# Patient Record
Sex: MALE | Race: BLACK OR AFRICAN AMERICAN
[De-identification: names, ages, dates, MRNs, and addresses within clinical notes are randomized per-mention and may not be internally consistent; named-entity substitution may affect disease eponyms.]

---

## 2017-04-21 ENCOUNTER — HOSPITAL ENCOUNTER (OUTPATIENT)
Dept: HOSPITAL 62 - RAD | Age: 47
End: 2017-04-21
Attending: PHYSICIAN ASSISTANT
Payer: MEDICAID

## 2017-04-21 DIAGNOSIS — K21.9: ICD-10-CM

## 2017-04-21 DIAGNOSIS — R06.2: Primary | ICD-10-CM

## 2017-04-21 DIAGNOSIS — K44.9: ICD-10-CM

## 2017-04-21 PROCEDURE — 74210 X-RAY XM PHRNX&/CRV ESOPH C+: CPT

## 2018-06-11 ENCOUNTER — HOSPITAL ENCOUNTER (OUTPATIENT)
Dept: HOSPITAL 62 - OD | Age: 48
End: 2018-06-11
Attending: PSYCHIATRY & NEUROLOGY
Payer: MEDICAID

## 2018-06-11 DIAGNOSIS — G70.01: Primary | ICD-10-CM

## 2018-06-11 LAB
FREE T4 (FREE THYROXINE): 0.98 NG/DL (ref 0.78–2.19)
T3FREE SERPL-MCNC: 3.84 PG/ML (ref 2.77–5.27)
TSH SERPL-ACNC: 3.53 UIU/ML (ref 0.47–4.68)

## 2018-06-11 PROCEDURE — 84443 ASSAY THYROID STIM HORMONE: CPT

## 2018-06-11 PROCEDURE — 84481 FREE ASSAY (FT-3): CPT

## 2018-06-11 PROCEDURE — 36415 COLL VENOUS BLD VENIPUNCTURE: CPT

## 2018-06-11 PROCEDURE — 84439 ASSAY OF FREE THYROXINE: CPT

## 2018-06-15 ENCOUNTER — HOSPITAL ENCOUNTER (OUTPATIENT)
Dept: HOSPITAL 62 - OD | Age: 48
End: 2018-06-15
Attending: PSYCHIATRY & NEUROLOGY
Payer: MEDICAID

## 2018-06-15 DIAGNOSIS — G70.01: Primary | ICD-10-CM

## 2018-06-15 PROCEDURE — 36415 COLL VENOUS BLD VENIPUNCTURE: CPT

## 2018-06-15 PROCEDURE — 83519 RIA NONANTIBODY: CPT

## 2019-03-06 ENCOUNTER — HOSPITAL ENCOUNTER (OUTPATIENT)
Dept: HOSPITAL 62 - OD | Age: 49
End: 2019-03-06
Attending: PHYSICIAN ASSISTANT
Payer: MEDICAID

## 2019-03-06 DIAGNOSIS — R06.2: Primary | ICD-10-CM

## 2019-03-06 PROCEDURE — 71046 X-RAY EXAM CHEST 2 VIEWS: CPT

## 2019-03-06 NOTE — RADIOLOGY REPORT (SQ)
EXAM DESCRIPTION:  CHEST PA/LATERAL



COMPLETED DATE/TIME:  3/6/2019 3:14 pm



REASON FOR STUDY:  WHEEZING



COMPARISON:  11/21/2012



EXAM PARAMETERS:  NUMBER OF VIEWS: two views

TECHNIQUE: Digital Frontal and Lateral radiographic views of the chest acquired.

RADIATION DOSE: NA

LIMITATIONS: none



FINDINGS:  LUNGS AND PLEURA: No opacities, masses or pneumothorax. No pleural effusion.

MEDIASTINUM AND HILAR STRUCTURES: No masses or contour abnormalities.

HEART AND VASCULAR STRUCTURES: Heart normal size.  No evidence for failure.

BONES: No acute findings.

HARDWARE:  Prior anterior median sternotomy.

OTHER:  Hiatal hernia suggested.



IMPRESSION:  1.  NO SIGNIFICANT RADIOGRAPHIC FINDING IN THE CHEST.

2.  Hiatal hernia.



TECHNICAL DOCUMENTATION:  JOB ID:  [id number]

 2011 [employer]- All Rights Reserved



Reading location - IP/workstation name: [email or web address]

## 2019-10-09 ENCOUNTER — HOSPITAL ENCOUNTER (OUTPATIENT)
Dept: HOSPITAL 62 - OD | Age: 49
End: 2019-10-09
Attending: PHYSICIAN ASSISTANT
Payer: MEDICAID

## 2019-10-09 DIAGNOSIS — M54.9: Primary | ICD-10-CM

## 2019-10-09 PROCEDURE — 72070 X-RAY EXAM THORAC SPINE 2VWS: CPT

## 2019-10-09 NOTE — RADIOLOGY REPORT (SQ)
EXAM DESCRIPTION:  RIBS LEFT W/PA CHEST



COMPLETED DATE/TIME:  10/9/2019 2:55 pm



REASON FOR STUDY:  DORSALGIA, UNSPECIFIED M54.9  DORSALGIA, UNSPECIFIED



COMPARISON:  None.



TECHNIQUE:  Frontal view of the chest and additional views of the left ribs acquired.



NUMBER OF VIEWS:  Five view.



LIMITATIONS:  None.



FINDINGS:  FRONTAL CXR: No pneumothorax.  No pleural effusion.  No atelectasis or infiltrates.

RIBS: No displaced rib fractures.  No lytic or blastic bony lesions.

OTHER: Sternotomy changes.  Hiatal hernia.



IMPRESSION:  NO PNEUMOTHORAX.  NO DISPLACED RIB FRACTURES.



COMMENT:  SITE OF TRAUMA/COMPLAINT MARKED/STAMP COMPLETED: NO.



TECHNICAL DOCUMENTATION:  JOB ID:  [id number]

 2011 [employer]- All Rights Reserved



Reading location - IP/workstation name: [email or web address]

## 2019-10-09 NOTE — RADIOLOGY REPORT (SQ)
EXAM DESCRIPTION:  T SPINE AP/LAT



COMPLETED DATE/TIME:  10/9/2019 2:55 pm



REASON FOR STUDY:  DORSALGIA, UNSPECIFIED M54.9  DORSALGIA, UNSPECIFIED



COMPARISON:  None.



NUMBER OF VIEWS:  Two views.



TECHNIQUE:  AP and lateral radiographic images acquired of the thoracic spine.



LIMITATIONS:  None.



FINDINGS:  MINERALIZATION: Normal.

ALIGNMENT: Normal.  No scoliosis.

VERTEBRAE: No fracture or bone lesion.  Maintained height, normal segmentation.

DISCS: No significant loss of height or significant narrowing.  No large osteophytes.

HARDWARE: Sternotomy hardware and surgical clips.  Unchanged fractured inferior sternotomy wires.

MEDIASTINUM AND SOFT TISSUES: Normal heart size and aortic contour.  Hiatal hernia.

VISUALIZED LUNG FIELDS: Clear.

OTHER: No other significant finding.



IMPRESSION:  No evidence of acute bony abnormality.

Hiatal hernia.



TECHNICAL DOCUMENTATION:  JOB ID:  [id number]

 2011 [employer]- All Rights Reserved



Reading location - IP/workstation name: [email or web address]